# Patient Record
Sex: FEMALE | Race: WHITE
[De-identification: names, ages, dates, MRNs, and addresses within clinical notes are randomized per-mention and may not be internally consistent; named-entity substitution may affect disease eponyms.]

---

## 2018-05-08 ENCOUNTER — HOSPITAL ENCOUNTER (OUTPATIENT)
Dept: HOSPITAL 74 - JASUSAT | Age: 58
LOS: 1 days | Discharge: HOME | End: 2018-05-09
Attending: SURGERY
Payer: COMMERCIAL

## 2018-05-08 VITALS — BODY MASS INDEX: 28.5 KG/M2

## 2018-05-08 DIAGNOSIS — D44.0: ICD-10-CM

## 2018-05-08 DIAGNOSIS — E05.20: Primary | ICD-10-CM

## 2018-05-08 PROCEDURE — 0GTK0ZZ RESECTION OF THYROID GLAND, OPEN APPROACH: ICD-10-PCS | Performed by: SURGERY

## 2018-05-08 PROCEDURE — 0GBJ0ZZ EXCISION OF THYROID GLAND ISTHMUS, OPEN APPROACH: ICD-10-PCS | Performed by: SURGERY

## 2018-05-08 RX ADMIN — METOPROLOL TARTRATE SCH MG: 50 TABLET, FILM COATED ORAL at 21:11

## 2018-05-08 RX ADMIN — ACETAMINOPHEN PRN MG: 325 TABLET ORAL at 20:52

## 2018-05-08 NOTE — OP
DATE OF OPERATION:  05/08/2018

 

PREOPERATIVE DIAGNOSIS:  Thyrotoxic nodular/multinodular goiter.

 

POSTOPERATIVE DIAGNOSIS:  Thyrotoxic nodular/multinodular goiter.

 

OPERATIVE PROCEDURE:  Total thyroidectomy.

 

SURGEON:  Eleanor Cullen MD

 

ASSISTANT:  EDUARDO Barros

 

ANESTHESIA:  General endotracheal intubation.

 

Patient was admitted with a history of multinodular goiter with thyrotoxicosis and

patient has other medical problems including multiple sclerosis and general weakness.

 With that, with the medication, patient was not doing well; so, she elected to have

a thyroidectomy.

 

After explaining risks and benefits, the patient was brought to the operating room. 

Neck was prepped and draped, and general anesthesia was given.  _____ of neck was

extended, and incision was made in the anterior neck.  A flap was created above and

below with the subplatysmal plane, and the midline was opened.  The thyroid gland was

identified.  The strap muscles were dissected laterally, and medial rotation of the

thyroid gland was done.  The superior laryngeal nerve was identified and so was the

recurrent laryngeal nerve bilaterally, and care was taken to avoid injuring them. 

The superior vessels were taken right at the gland level and inferior away from the

thyroid gland.  Then, the isthmus of the thyroid gland was taken away from the

tracheal attachment.  There were a lot of adhesions of the muscle to the thyroid in

the beginning of the surgery, and so, _____ making the dissection a little more

tedious than normal, but no complications were encountered.  After adequate

hemostasis, the strap muscles were closed, and the platysma was closed subcuticular. 

Local Marcaine was injected.  Patient went to the recovery room, and the right

superior parathyroid gland was identified and preserved.

 

 

ELEANOR CULLEN M.D. [id number]

DD: 05/08/2018 14:01

DT: 05/08/2018 15:17

Job #:  [id number]

cc:  Nellie Cullen MD

## 2018-05-08 NOTE — SURG
Surgery First Assist Note


First Assist: Papo Diaz PA-C


Date of Service: 05/08/18


Diagnosis: 





Toxic goiter


Procedure: 





Total thyroidectomy


I was present for the entirety of the operative procedure. For further detail, 

please refer to operative report.








Visit type





- Case Type


Case Type: Scheduled Admission





- New patient


This patient is new to me today: Yes


Date on this admission: 05/08/18

## 2018-05-09 VITALS — HEART RATE: 92 BPM | TEMPERATURE: 98.6 F | SYSTOLIC BLOOD PRESSURE: 122 MMHG | DIASTOLIC BLOOD PRESSURE: 66 MMHG

## 2018-05-09 RX ADMIN — METOPROLOL TARTRATE SCH MG: 50 TABLET, FILM COATED ORAL at 09:46

## 2018-05-09 RX ADMIN — ACETAMINOPHEN PRN MG: 325 TABLET ORAL at 07:00

## 2018-05-09 RX ADMIN — ACETAMINOPHEN PRN MG: 325 TABLET ORAL at 02:10

## 2018-05-11 NOTE — PATH
Surgical Pathology Report



Patient Name:  KENISHA SANTO

Accession #:  [id number]

[facility]. Rec. #:  [id number]                                                        

   /Age/Gender:  1960 (Age: 58) / F

Account:  [id number]                                                          

             Location: AMBULATORY SURG

Taken:  2018

Received:  2018

Reported:  2018

Physicians:  Kina Cullen M.D.

  



Specimen(s) Received

 TOTAL THYROID 





Clinical History

Toxic goiter







Final Diagnosis

THYROID, TOTAL THYROIDECTOMY:

NON-INVASIVE FOLLICULAR THYROID NEOPLASM WITH PAPILLARY LIKE NUCLEAR FEATURES

(NIFTP).

NEOPLASM IS LOCATED AT THE RIGHT LOBE AND MEASURES 4 MM IN GREATEST MICROSCOPIC

DIMENSION.

SURGICAL MARGINS ARE NEGATIVE.

REMAINDER OF THYROID PARENCHYMA SHOWS MULTI NODULAR HYPERPLASIA AND CHRONIC

LYMPHOCYTIC THYROIDITIS.

ONE BENIGN LYMPH NODE (0/1).

ONE PARATHYROID GLAND.



Comment: Office of Dr. Cullen informed that significant findings will be

faxed (). 





***Electronically Signed***

Kenisha Lamas M.D.





Gross Description

Received in formalin labeled "total thyroid gland," is a 17 g total

thyroidectomy specimen. The outer capsule is intact. The right lobe (4.0 x 2.3 x

1.8 cm) is inked red, the left lobe (4.0 x 2.3 x 1.7 cm) is inked blue and the

isthmus (1.8 x 1.7 x 0.6 cm) is inked green. Sectioning reveals a 0.4 x 0.4 x

0.4 cm tan, solid nodule in the central to inferior pole of the right lobe. The

nodule is 0.1 cm from the outer capsule. The remaining thyroid parenchyma is

tan-brown with focal small nodules. The specimen is entirely submitted in 18

cassettes as follows: 1-8-right lobe sequentially submitted from superior to

inferior (solid nodule in cassettes 5-6); 9-10-Isthmus submitted from left to

right; 11-18-left lobe sequentially submitted from superior to inferior.

/2018